# Patient Record
Sex: FEMALE | Race: WHITE | NOT HISPANIC OR LATINO | Employment: OTHER | URBAN - NONMETROPOLITAN AREA
[De-identification: names, ages, dates, MRNs, and addresses within clinical notes are randomized per-mention and may not be internally consistent; named-entity substitution may affect disease eponyms.]

---

## 2019-05-31 ENCOUNTER — OFFICE VISIT (OUTPATIENT)
Dept: CARDIOLOGY | Facility: CLINIC | Age: 64
End: 2019-05-31
Payer: MEDICARE

## 2019-05-31 VITALS
HEART RATE: 70 BPM | HEIGHT: 66 IN | SYSTOLIC BLOOD PRESSURE: 100 MMHG | DIASTOLIC BLOOD PRESSURE: 60 MMHG | WEIGHT: 123 LBS | OXYGEN SATURATION: 96 % | BODY MASS INDEX: 19.77 KG/M2

## 2019-05-31 DIAGNOSIS — R00.2 PALPITATIONS: ICD-10-CM

## 2019-05-31 DIAGNOSIS — Z13.220 SCREENING FOR CHOLESTEROL LEVEL: ICD-10-CM

## 2019-05-31 DIAGNOSIS — R07.9 CHEST PAIN, UNSPECIFIED TYPE: ICD-10-CM

## 2019-05-31 DIAGNOSIS — R07.89 OTHER CHEST PAIN: ICD-10-CM

## 2019-05-31 LAB — EKG IMPRESSION: NORMAL

## 2019-05-31 PROCEDURE — 93000 ELECTROCARDIOGRAM COMPLETE: CPT | Performed by: INTERNAL MEDICINE

## 2019-05-31 PROCEDURE — 99205 OFFICE O/P NEW HI 60 MIN: CPT | Performed by: INTERNAL MEDICINE

## 2019-05-31 RX ORDER — FLUTICASONE PROPIONATE 50 MCG
1 SPRAY, SUSPENSION (ML) NASAL DAILY
COMMUNITY

## 2019-05-31 ASSESSMENT — ENCOUNTER SYMPTOMS
SHORTNESS OF BREATH: 0
ABDOMINAL PAIN: 0
PND: 0
PALPITATIONS: 1
LOSS OF CONSCIOUSNESS: 0
DIZZINESS: 0
FALLS: 0
DEPRESSION: 0
ORTHOPNEA: 0

## 2019-05-31 NOTE — PROGRESS NOTES
Chief Complaint   Patient presents with   • Chest Pain     Subjective:   Ashley Montague is a 64 y.o. female referred to cardiology clinic for evaluation of chest pain.    Patient reports that about 10 years ago she underwent a cardiac work-up with Dr. Velazquez at [facility].  Based on her description it appears that she underwent a coronary angiogram that was reportedly normal.  At that time she had palpitations and she was instructed to perform vagal maneuvers should she have recurrent symptoms.    She reports being in his usual state of health till about 2 to 3 weeks ago when she started having palpitations that occur every 1-3 days without any clear triggers and spontaneously resolve in a few seconds to a minute. She reports associated dizziness and chills. No history of syncope.     In the past week she has had 3 episodes of substernal nonradiating chest tightness that usually occur when she is doing some type of activity either gardening or cooking and symptoms were associated with nausea, chills and fatigue and spontaneously resolved in a few minutes.  She denies any prior history of similar symptoms.  She went for a 20-minute walk today and did not have any symptoms with walking.    She is a retired .     Referring physician: Alex Mcghee MD    Past Medical History:   Diagnosis Date   • Allergic rhinitis    • ASTHMA    • Back pain    • Chronic fatigue    • Disc herniation     L4,L5   • GERD (gastroesophageal reflux disease)    • Osteoarthritis    • Perimenopausal    • Senile osteoporosis 10/9/2013     Past Surgical History:   Procedure Laterality Date   • SHOULDER ARTHROSCOPY  2007    right   • APPENDECTOMY  1970     Family History   Problem Relation Age of Onset   • Other Sister         hyperparathyroidism   • Osteoporosis Sister    • Osteoporosis Mother    • Other Maternal Grandmother         hip fracture     Social History     Social History   • Marital status:      Spouse  "name: N/A   • Number of children: N/A   • Years of education: N/A     Occupational History   • Not on file.     Social History Main Topics   • Smoking status: Never Smoker   • Smokeless tobacco: Never Used   • Alcohol use No   • Drug use: No   • Sexual activity: Not on file     Other Topics Concern   • Not on file     Social History Narrative   • No narrative on file     Allergies   Allergen Reactions   • Avelox [Moxifloxacin Hcl In Nacl]    • Pcn [Penicillins] Hives     Outpatient Encounter Prescriptions as of 5/31/2019   Medication Sig Dispense Refill   • fluticasone (FLONASE) 50 MCG/ACT nasal spray Spray 1 Spray in nose every day.     • tramadol (ULTRAM) 50 MG TABS Take 50 mg by mouth every four hours as needed.     • Cholecalciferol (VITAMIN D-3) 5000 UNITS TABS Take 1 Tab by mouth every day. 30 Tab 11   • Multiple Vitamins-Minerals (ALIVE WOMENS 50+ PO) Take  by mouth.       • Calcium-Phosphorus 600-280 MG TABS Take 600 mg by mouth 3 times a day. With meals 60 Tab    • [DISCONTINUED] Cetirizine HCl (ZYRTEC ALLERGY) 10 MG CAPS Take  by mouth 2 Times a Day. 30 Cap      No facility-administered encounter medications on file as of 5/31/2019.      Review of Systems   Constitutional: Negative for malaise/fatigue.   Respiratory: Negative for shortness of breath.    Cardiovascular: Positive for chest pain and palpitations. Negative for orthopnea, leg swelling and PND.   Gastrointestinal: Negative for abdominal pain.   Musculoskeletal: Negative for falls.   Neurological: Negative for dizziness and loss of consciousness.   Psychiatric/Behavioral: Negative for depression.   All other systems reviewed and are negative.       Objective:   /60 (BP Location: Right arm, Patient Position: Sitting)   Pulse 70   Ht 1.676 m (5' 6\")   Wt 55.8 kg (123 lb)   SpO2 96%   BMI 19.85 kg/m²     Physical Exam   Constitutional: She is oriented to person, place, and time. She appears well-developed and well-nourished. No distress. "   HENT:   Head: Normocephalic and atraumatic.   Eyes: Conjunctivae are normal. No scleral icterus.   Neck: Normal range of motion. Neck supple.   Cardiovascular: Normal rate, regular rhythm and normal heart sounds.  Exam reveals no gallop and no friction rub.    No murmur heard.  Pulmonary/Chest: Effort normal and breath sounds normal. No respiratory distress. She has no wheezes. She has no rales.   Abdominal: Soft. She exhibits no distension. There is no tenderness.   Musculoskeletal: She exhibits no edema.   Neurological: She is alert and oriented to person, place, and time.   Skin: Skin is warm and dry. She is not diaphoretic.   Psychiatric: She has a normal mood and affect. Her behavior is normal.   Nursing note and vitals reviewed.    EKG performed today was personally reviewed and per my interpretation shows sinus bradycardia 58 bpm.  Otherwise normal EKG.    Assessment:     1. Other chest pain  EKG   2. Chest pain, unspecified type  Basic Metabolic Panel    Treadmill Stress   3. Palpitations  CBC WITHOUT DIFFERENTIAL    FREE THYROXINE    TSH    Holter Monitor Study   4. Screening for cholesterol level  Lipid Profile       Medical Decision Making:  Today's Assessment / Status / Plan:     Palpitations: Patient reports having almost daily symptoms.  She will be referred for a 48-hour Holter monitor for further evaluation.  She does not use much caffeine or alcohol.    Chest discomfort: Mostly atypical in nature.  Patient exercises regularly without any cardiac symptoms.  She will be referred for an exercise treadmill test.  She is bradycardic at baseline and her symptoms could be secondary to chronotropic incompetence.  I advised patient to start a baby aspirin every day for now.     Basic labs including CBC and thyroid testing were ordered today.     Return to clinic in 3 months or earlier if needed.    Thank you for allowing me to participate in the care of this patient. Please do not hesitate to contact me  with any questions.    Supriya Kamara MD  Cardiologist  [facility]      PLEASE NOTE: This dictation was created using voice recognition software.

## 2019-06-04 ENCOUNTER — NON-PROVIDER VISIT (OUTPATIENT)
Dept: CARDIOLOGY | Facility: CLINIC | Age: 64
End: 2019-06-04
Payer: MEDICARE

## 2019-06-04 DIAGNOSIS — R00.2 PALPITATIONS: ICD-10-CM

## 2019-06-04 DIAGNOSIS — I49.3 PVC (PREMATURE VENTRICULAR CONTRACTION): ICD-10-CM

## 2019-06-07 DIAGNOSIS — R00.2 PALPITATIONS: ICD-10-CM

## 2019-06-10 LAB — EKG IMPRESSION: NORMAL

## 2019-06-10 PROCEDURE — 93224 XTRNL ECG REC UP TO 48 HRS: CPT | Performed by: INTERNAL MEDICINE

## 2019-10-17 ENCOUNTER — OFFICE VISIT (OUTPATIENT)
Dept: CARDIOLOGY | Facility: CLINIC | Age: 64
End: 2019-10-17
Payer: MEDICARE

## 2019-10-17 VITALS
WEIGHT: 125 LBS | OXYGEN SATURATION: 94 % | BODY MASS INDEX: 20.09 KG/M2 | SYSTOLIC BLOOD PRESSURE: 100 MMHG | DIASTOLIC BLOOD PRESSURE: 50 MMHG | HEIGHT: 66 IN | HEART RATE: 80 BPM

## 2019-10-17 DIAGNOSIS — R53.83 FATIGUE, UNSPECIFIED TYPE: ICD-10-CM

## 2019-10-17 DIAGNOSIS — R00.2 PALPITATIONS: ICD-10-CM

## 2019-10-17 DIAGNOSIS — G47.33 OSA (OBSTRUCTIVE SLEEP APNEA): ICD-10-CM

## 2019-10-17 DIAGNOSIS — R07.9 CHEST PAIN, UNSPECIFIED TYPE: ICD-10-CM

## 2019-10-17 PROCEDURE — 99215 OFFICE O/P EST HI 40 MIN: CPT | Performed by: INTERNAL MEDICINE

## 2019-10-17 ASSESSMENT — ENCOUNTER SYMPTOMS
DIZZINESS: 0
PND: 0
FALLS: 0
SHORTNESS OF BREATH: 0
PALPITATIONS: 1
ORTHOPNEA: 0
LOSS OF CONSCIOUSNESS: 0
ABDOMINAL PAIN: 0
DEPRESSION: 0
HEADACHES: 1

## 2019-10-17 NOTE — PROGRESS NOTES
Chief Complaint   Patient presents with   • Chest Pain     Subjective:   Ashley Montague is a 4-year-old female presented clinic for follow-up on chest discomfort.    Since her last visit, patient reports that she has not had any recurrent chest discomfort.    Her palpitations have significantly improved with hydration.  On the days that she does have recurrent symptoms she notices that she has not had enough water.    Her main concern today is fatigue that she notices almost all day long.  She is also noticed daytime headaches and often requires naps during the daytime.  Her  does not report that she snores at night but the patient does report working up in the middle of the night gasping for air.    Prior history:  Around 2009, patient underwent a cardiac work-up with Dr. Velazquez at [facility].  Based on her description it appears that she underwent a coronary angiogram that was reportedly normal.  At that time she had palpitations and she was instructed to perform vagal maneuvers should she have recurrent symptoms.    She is a retired .       Past Medical History:   Diagnosis Date   • Allergic rhinitis    • ASTHMA    • Back pain    • Chronic fatigue    • Disc herniation     L4,L5   • GERD (gastroesophageal reflux disease)    • Osteoarthritis    • Perimenopausal    • Senile osteoporosis 10/9/2013     Past Surgical History:   Procedure Laterality Date   • SHOULDER ARTHROSCOPY  2007    right   • APPENDECTOMY  1970     Family History   Problem Relation Age of Onset   • Other Sister         hyperparathyroidism   • Osteoporosis Sister    • Osteoporosis Mother    • Other Maternal Grandmother         hip fracture     Social History     Socioeconomic History   • Marital status:      Spouse name: Not on file   • Number of children: Not on file   • Years of education: Not on file   • Highest education level: Not on file   Occupational History   • Not on file   Social Needs   • Financial  resource strain: Not on file   • Food insecurity:     Worry: Not on file     Inability: Not on file   • Transportation needs:     Medical: Not on file     Non-medical: Not on file   Tobacco Use   • Smoking status: Never Smoker   • Smokeless tobacco: Never Used   Substance and Sexual Activity   • Alcohol use: No   • Drug use: No   • Sexual activity: Not on file   Lifestyle   • Physical activity:     Days per week: Not on file     Minutes per session: Not on file   • Stress: Not on file   Relationships   • Social connections:     Talks on phone: Not on file     Gets together: Not on file     Attends Anglican service: Not on file     Active member of club or organization: Not on file     Attends meetings of clubs or organizations: Not on file     Relationship status: Not on file   • Intimate partner violence:     Fear of current or ex partner: Not on file     Emotionally abused: Not on file     Physically abused: Not on file     Forced sexual activity: Not on file   Other Topics Concern   • Not on file   Social History Narrative   • Not on file     Allergies   Allergen Reactions   • Avelox [Moxifloxacin Hcl In Nacl]    • Pcn [Penicillins] Hives     Outpatient Encounter Medications as of 10/17/2019   Medication Sig Dispense Refill   • fluticasone (FLONASE) 50 MCG/ACT nasal spray Spray 1 Spray in nose every day.     • aspirin EC (ECOTRIN) 81 MG Tablet Delayed Response Take 1 Tab by mouth every day. 30 Tab    • tramadol (ULTRAM) 50 MG TABS Take 50 mg by mouth every four hours as needed.     • Cholecalciferol (VITAMIN D-3) 5000 UNITS TABS Take 1 Tab by mouth every day. 30 Tab 11   • Calcium-Phosphorus 600-280 MG TABS Take 600 mg by mouth 3 times a day. With meals 60 Tab    • Multiple Vitamins-Minerals (ALIVE WOMENS 50+ PO) Take  by mouth.         No facility-administered encounter medications on file as of 10/17/2019.      Review of Systems   Constitutional: Positive for malaise/fatigue.   Respiratory: Negative for  "shortness of breath.    Cardiovascular: Positive for palpitations. Negative for chest pain, orthopnea, leg swelling and PND.   Gastrointestinal: Negative for abdominal pain.   Musculoskeletal: Negative for falls.   Neurological: Positive for headaches. Negative for dizziness and loss of consciousness.   Psychiatric/Behavioral: Negative for depression.   All other systems reviewed and are negative.       Objective:   /50 (BP Location: Right arm, Patient Position: Sitting)   Pulse 80   Ht 1.676 m (5' 6\")   Wt 56.7 kg (125 lb)   SpO2 94%   BMI 20.18 kg/m²     Physical Exam   Constitutional: She is oriented to person, place, and time. She appears well-developed and well-nourished. No distress.   HENT:   Head: Normocephalic and atraumatic.   Eyes: Conjunctivae are normal. No scleral icterus.   Neck: Normal range of motion. Neck supple.   Cardiovascular: Normal rate, regular rhythm and normal heart sounds. Exam reveals no gallop and no friction rub.   No murmur heard.  Pulmonary/Chest: Effort normal and breath sounds normal. No respiratory distress. She has no wheezes. She has no rales.   Abdominal: Soft. She exhibits no distension. There is no tenderness.   Musculoskeletal: She exhibits no edema.   Neurological: She is alert and oriented to person, place, and time.   Skin: Skin is warm and dry. She is not diaphoretic.   Psychiatric: She has a normal mood and affect. Her behavior is normal.   Nursing note and vitals reviewed.    Labs performed in June 2019 were reviewed and showed normal hemoglobin and creatinine.  .  HDL 70.    Holter monitor performed June 2019 was personally reviewed and per my interpretation showed sinus rhythm.  No sustained arrhythmias.    Exercise treadmill test performed in June 2019 was personally reviewed and per my interpretation did not show any evidence of ischemia.  Patient exercised on the Kory protocol for almost 9 minutes and target heart rate was " achieved.    Assessment:     1. Chest pain, unspecified type     2. Palpitations     3. Fatigue, unspecified type  REFERRAL TO SLEEP STUDIES   4. NAGA (obstructive sleep apnea)  REFERRAL TO SLEEP STUDIES       Medical Decision Making:  Today's Assessment / Status / Plan:     Palpitations: Patient symptoms have improved with hydration.  I have encouraged her to continue staying hydrated as much as possible.  If her symptoms worsen, she should let us know.    Chest discomfort: Atypical in nature.  Patient has not had any recurrent symptoms.  Her treadmill test was low risk as detailed above.  For now no further work-up.  If she has recurrent symptoms, she should let us know.    Fatigue: new issue.  Morning headaches:  Concern for sleep apnea:  Patient symptoms are concerning for sleep apnea and she will therefore be referred for a sleep study.  Referral placed today.  Patient is agreeable.    Return to clinic if needed.    Thank you for allowing me to participate in the care of this patient. Please do not hesitate to contact me with any questions.    Supriya Kamara MD  Cardiologist  [facility]      PLEASE NOTE: This dictation was created using voice recognition software.

## 2019-10-17 NOTE — LETTER
[facility] [address],   [address]nd [place] [address]  Phone: [phone]  Fax: [phone]              Ashley Montague  1955    Encounter Date: 10/17/2019    Supriya Kamara M.D.          PROGRESS NOTE:  Chief Complaint   Patient presents with   • Chest Pain     Subjective:   Ashley Montague is a 4-year-old female presented clinic for follow-up on chest discomfort.    Since her last visit, patient reports that she has not had any recurrent chest discomfort.    Her palpitations have significantly improved with hydration.  On the days that she does have recurrent symptoms she notices that she has not had enough water.    Her main concern today is fatigue that she notices almost all day long.  She is also noticed daytime headaches and often requires naps during the daytime.  Her  does not report that she snores at night but the patient does report working up in the middle of the night gasping for air.    Prior history:  Around 2009, patient underwent a cardiac work-up with Dr. Velazquez at [facility].  Based on her description it appears that she underwent a coronary angiogram that was reportedly normal.  At that time she had palpitations and she was instructed to perform vagal maneuvers should she have recurrent symptoms.    She is a retired .       Past Medical History:   Diagnosis Date   • Allergic rhinitis    • ASTHMA    • Back pain    • Chronic fatigue    • Disc herniation     L4,L5   • GERD (gastroesophageal reflux disease)    • Osteoarthritis    • Perimenopausal    • Senile osteoporosis 10/9/2013     Past Surgical History:   Procedure Laterality Date   • SHOULDER ARTHROSCOPY  2007    right   • APPENDECTOMY  1970     Family History   Problem Relation Age of Onset   • Other Sister         hyperparathyroidism   • Osteoporosis Sister    • Osteoporosis Mother    • Other Maternal Grandmother         hip fracture       Social History     Socioeconomic History   • Marital status:      Spouse name: Not on file   • Number of children: Not on file   • Years of education: Not on file   • Highest education level: Not on file   Occupational History   • Not on file   Social Needs   • Financial resource strain: Not on file   • Food insecurity:     Worry: Not on file     Inability: Not on file   • Transportation needs:     Medical: Not on file     Non-medical: Not on file   Tobacco Use   • Smoking status: Never Smoker   • Smokeless tobacco: Never Used   Substance and Sexual Activity   • Alcohol use: No   • Drug use: No   • Sexual activity: Not on file   Lifestyle   • Physical activity:     Days per week: Not on file     Minutes per session: Not on file   • Stress: Not on file   Relationships   • Social connections:     Talks on phone: Not on file     Gets together: Not on file     Attends Christian service: Not on file     Active member of club or organization: Not on file     Attends meetings of clubs or organizations: Not on file     Relationship status: Not on file   • Intimate partner violence:     Fear of current or ex partner: Not on file     Emotionally abused: Not on file     Physically abused: Not on file     Forced sexual activity: Not on file   Other Topics Concern   • Not on file   Social History Narrative   • Not on file     Allergies   Allergen Reactions   • Avelox [Moxifloxacin Hcl In Nacl]    • Pcn [Penicillins] Hives     Outpatient Encounter Medications as of 10/17/2019   Medication Sig Dispense Refill   • fluticasone (FLONASE) 50 MCG/ACT nasal spray Spray 1 Spray in nose every day.     • aspirin EC (ECOTRIN) 81 MG Tablet Delayed Response Take 1 Tab by mouth every day. 30 Tab    • tramadol (ULTRAM) 50 MG TABS Take 50 mg by mouth every four hours as needed.     • Cholecalciferol (VITAMIN D-3) 5000 UNITS TABS Take 1 Tab by mouth every day. 30 Tab 11   • Calcium-Phosphorus 600-280 MG TABS Take 600 mg by mouth 3  "times a day. With meals 60 Tab    • Multiple Vitamins-Minerals (ALIVE WOMENS 50+ PO) Take  by mouth.         No facility-administered encounter medications on file as of 10/17/2019.      Review of Systems   Constitutional: Positive for malaise/fatigue.   Respiratory: Negative for shortness of breath.    Cardiovascular: Positive for palpitations. Negative for chest pain, orthopnea, leg swelling and PND.   Gastrointestinal: Negative for abdominal pain.   Musculoskeletal: Negative for falls.   Neurological: Positive for headaches. Negative for dizziness and loss of consciousness.   Psychiatric/Behavioral: Negative for depression.   All other systems reviewed and are negative.       Objective:   /50 (BP Location: Right arm, Patient Position: Sitting)   Pulse 80   Ht 1.676 m (5' 6\")   Wt 56.7 kg (125 lb)   SpO2 94%   BMI 20.18 kg/m²      Physical Exam   Constitutional: She is oriented to person, place, and time. She appears well-developed and well-nourished. No distress.   HENT:   Head: Normocephalic and atraumatic.   Eyes: Conjunctivae are normal. No scleral icterus.   Neck: Normal range of motion. Neck supple.   Cardiovascular: Normal rate, regular rhythm and normal heart sounds. Exam reveals no gallop and no friction rub.   No murmur heard.  Pulmonary/Chest: Effort normal and breath sounds normal. No respiratory distress. She has no wheezes. She has no rales.   Abdominal: Soft. She exhibits no distension. There is no tenderness.   Musculoskeletal: She exhibits no edema.   Neurological: She is alert and oriented to person, place, and time.   Skin: Skin is warm and dry. She is not diaphoretic.   Psychiatric: She has a normal mood and affect. Her behavior is normal.   Nursing note and vitals reviewed.    Labs performed in June 2019 were reviewed and showed normal hemoglobin and creatinine.  .  HDL 70.    Holter monitor performed June 2019 was personally reviewed and per my interpretation showed sinus " rhythm.  No sustained arrhythmias.    Exercise treadmill test performed in June 2019 was personally reviewed and per my interpretation did not show any evidence of ischemia.  Patient exercised on the Kory protocol for almost 9 minutes and target heart rate was achieved.    Assessment:     1. Chest pain, unspecified type     2. Palpitations     3. Fatigue, unspecified type  REFERRAL TO SLEEP STUDIES   4. NAGA (obstructive sleep apnea)  REFERRAL TO SLEEP STUDIES       Medical Decision Making:  Today's Assessment / Status / Plan:     Palpitations: Patient symptoms have improved with hydration.  I have encouraged her to continue staying hydrated as much as possible.  If her symptoms worsen, she should let us know.    Chest discomfort: Atypical in nature.  Patient has not had any recurrent symptoms.  Her treadmill test was low risk as detailed above.  For now no further work-up.  If she has recurrent symptoms, she should let us know.    Fatigue: new issue.  Morning headaches:  Concern for sleep apnea:  Patient symptoms are concerning for sleep apnea and she will therefore be referred for a sleep study.  Referral placed today.  Patient is agreeable.    Return to clinic if needed.    Thank you for allowing me to participate in the care of this patient. Please do not hesitate to contact me with any questions.    Supriya Kamara MD  Cardiologist  [facility]      PLEASE NOTE: This dictation was created using voice recognition software.         Cedric Clayton D.O.  [id number]  [place] [address]  VIA Facsimile: [phone]

## 2019-11-15 ENCOUNTER — APPOINTMENT (RX ONLY)
Dept: URBAN - METROPOLITAN AREA CLINIC 31 | Facility: CLINIC | Age: 64
Setting detail: DERMATOLOGY
End: 2019-11-15

## 2019-11-15 DIAGNOSIS — L57.0 ACTINIC KERATOSIS: ICD-10-CM

## 2019-11-15 DIAGNOSIS — L82.1 OTHER SEBORRHEIC KERATOSIS: ICD-10-CM

## 2019-11-15 DIAGNOSIS — Z85.828 PERSONAL HISTORY OF OTHER MALIGNANT NEOPLASM OF SKIN: ICD-10-CM

## 2019-11-15 PROBLEM — D48.5 NEOPLASM OF UNCERTAIN BEHAVIOR OF SKIN: Status: ACTIVE | Noted: 2019-11-15

## 2019-11-15 PROCEDURE — 99202 OFFICE O/P NEW SF 15 MIN: CPT | Mod: 25

## 2019-11-15 PROCEDURE — 17003 DESTRUCT PREMALG LES 2-14: CPT

## 2019-11-15 PROCEDURE — 17000 DESTRUCT PREMALG LESION: CPT | Mod: 59

## 2019-11-15 PROCEDURE — ? BIOPSY BY SHAVE METHOD

## 2019-11-15 PROCEDURE — ? COUNSELING

## 2019-11-15 PROCEDURE — 11102 TANGNTL BX SKIN SINGLE LES: CPT

## 2019-11-15 PROCEDURE — ? LIQUID NITROGEN

## 2019-11-15 ASSESSMENT — LOCATION DETAILED DESCRIPTION DERM
LOCATION DETAILED: RIGHT MID PREAURICULAR CHEEK
LOCATION DETAILED: LEFT CENTRAL MALAR CHEEK
LOCATION DETAILED: LEFT SUPERIOR CENTRAL MALAR CHEEK
LOCATION DETAILED: RIGHT SUPERIOR CENTRAL MALAR CHEEK
LOCATION DETAILED: LEFT MEDIAL MALAR CHEEK
LOCATION DETAILED: LEFT INFERIOR CENTRAL MALAR CHEEK
LOCATION DETAILED: LEFT CENTRAL BUCCAL CHEEK
LOCATION DETAILED: NASAL DORSUM
LOCATION DETAILED: RIGHT INFERIOR PREAURICULAR CHEEK
LOCATION DETAILED: RIGHT EYEBROW
LOCATION DETAILED: RIGHT INFERIOR MEDIAL MALAR CHEEK
LOCATION DETAILED: LEFT SUPERIOR LATERAL FOREHEAD

## 2019-11-15 ASSESSMENT — LOCATION SIMPLE DESCRIPTION DERM
LOCATION SIMPLE: NOSE
LOCATION SIMPLE: LEFT CHEEK
LOCATION SIMPLE: RIGHT EYEBROW
LOCATION SIMPLE: LEFT FOREHEAD
LOCATION SIMPLE: RIGHT CHEEK

## 2019-11-15 ASSESSMENT — LOCATION ZONE DERM
LOCATION ZONE: NOSE
LOCATION ZONE: FACE

## 2019-11-15 NOTE — PROCEDURE: BIOPSY BY SHAVE METHOD
Lab Facility: 
Cryotherapy Text: The wound bed was treated with cryotherapy after the biopsy was performed.
Destruction After The Procedure: No
Wound Care: Vaseline
Post-Care Instructions: I reviewed with the patient in detail post-care instructions. Patient is to keep the biopsy site dry overnight, and then apply vaseline twice daily until healed.
Biopsy Type: H and E
Additional Anesthesia Volume In Cc (Will Not Render If 0): 0
Billing Type: Third-Party Bill
Lab: [id number]
Depth Of Biopsy: dermis
Curettage Text: The wound bed was treated with curettage after the biopsy was performed.
Anesthesia Type: 1% lidocaine with epinephrine
Size Of Lesion In Cm: 0.5
Silver Nitrate Text: The wound bed was treated with silver nitrate after the biopsy was performed.
Dressing: bandage
Hemostasis: Drysol and Electrocautery
Render Post-Care Instructions In Note?: yes
Detail Level: Detailed
Electrodesiccation And Curettage Text: The wound bed was treated with electrodesiccation and curettage after the biopsy was performed.
Type Of Destruction Used: Curettage
Biopsy Method: Personna blade
Consent: Written consent was obtained and risks were reviewed including but not limited to scarring, infection, bleeding, scabbing, incomplete removal, nerve damage and allergy to anesthesia.
Electrodesiccation Text: The wound bed was treated with electrodesiccation after the biopsy was performed.
Notification Instructions: Patient will be notified of biopsy results. However, patient instructed to call the office if not contacted within 2 weeks.

## 2021-07-16 ENCOUNTER — OFFICE VISIT (OUTPATIENT)
Dept: CARDIOLOGY | Facility: CLINIC | Age: 66
End: 2021-07-16
Payer: MEDICARE

## 2021-07-16 VITALS
HEIGHT: 66 IN | RESPIRATION RATE: 12 BRPM | DIASTOLIC BLOOD PRESSURE: 56 MMHG | WEIGHT: 135.2 LBS | SYSTOLIC BLOOD PRESSURE: 88 MMHG | OXYGEN SATURATION: 97 % | BODY MASS INDEX: 21.73 KG/M2 | HEART RATE: 68 BPM

## 2021-07-16 DIAGNOSIS — I95.1 ORTHOSTATIC HYPOTENSION: ICD-10-CM

## 2021-07-16 DIAGNOSIS — R00.2 PALPITATIONS: ICD-10-CM

## 2021-07-16 DIAGNOSIS — Z13.220 SCREENING FOR CHOLESTEROL LEVEL: ICD-10-CM

## 2021-07-16 DIAGNOSIS — R42 DIZZINESS: ICD-10-CM

## 2021-07-16 PROCEDURE — 99214 OFFICE O/P EST MOD 30 MIN: CPT | Performed by: INTERNAL MEDICINE

## 2021-07-16 ASSESSMENT — ENCOUNTER SYMPTOMS
FALLS: 0
PALPITATIONS: 1
PND: 0
HEADACHES: 0
ABDOMINAL PAIN: 0
LOSS OF CONSCIOUSNESS: 0
DEPRESSION: 0
ORTHOPNEA: 0
DIZZINESS: 1
SHORTNESS OF BREATH: 0

## 2021-07-16 NOTE — PROGRESS NOTES
Chief Complaint   Patient presents with   • Palpitations     Subjective:   Ashley Montague is a 4-year-old female presented clinic for evaluation of dizziness.    Patient reports dizziness with standing up either from a sitting position or from lying down.  Her blood pressure is almost always low, like today.  She is worried about getting a pacemaker as her father had one.  She occasionally reports palpitations which improved after she drinks water.  She has not had any syncopal episodes.      Past Medical History:   Diagnosis Date   • Allergic rhinitis    • ASTHMA    • Back pain    • Chronic fatigue    • Disc herniation     L4,L5   • GERD (gastroesophageal reflux disease)    • Osteoarthritis    • Perimenopausal    • Senile osteoporosis 10/9/2013     Past Surgical History:   Procedure Laterality Date   • SHOULDER ARTHROSCOPY  2007    right   • APPENDECTOMY  1970     Family History   Problem Relation Age of Onset   • Other Sister         hyperparathyroidism   • Osteoporosis Sister    • Osteoporosis Mother    • Other Maternal Grandmother         hip fracture     Social History     Socioeconomic History   • Marital status:      Spouse name: Not on file   • Number of children: Not on file   • Years of education: Not on file   • Highest education level: Not on file   Occupational History   • Not on file   Tobacco Use   • Smoking status: Never Smoker   • Smokeless tobacco: Never Used   Substance and Sexual Activity   • Alcohol use: Yes     Comment: Occ   • Drug use: No   • Sexual activity: Not on file   Other Topics Concern   • Not on file   Social History Narrative   • Not on file     Social Determinants of Health     Financial Resource Strain:    • Difficulty of Paying Living Expenses:    Food Insecurity:    • Worried About Running Out of Food in the Last Year:    • Ran Out of Food in the Last Year:    Transportation Needs:    • Lack of Transportation (Medical):    • Lack of Transportation (Non-Medical):     Physical Activity:    • Days of Exercise per Week:    • Minutes of Exercise per Session:    Stress:    • Feeling of Stress :    Social Connections:    • Frequency of Communication with Friends and Family:    • Frequency of Social Gatherings with Friends and Family:    • Attends [employer] Services:    • Active Member of Clubs or Organizations:    • Attends Club or Organization Meetings:    • Marital Status:    Intimate Partner Violence:    • Fear of Current or Ex-Partner:    • Emotionally Abused:    • Physically Abused:    • Sexually Abused:      Allergies   Allergen Reactions   • Avelox [Moxifloxacin Hcl In Nacl]    • Pcn [Penicillins] Hives     Outpatient Encounter Medications as of 7/16/2021   Medication Sig Dispense Refill   • methylPREDNISolone (MEDROL DOSEPAK) 4 MG Tablet Therapy Pack Follow schedule on package instructions. 21 tablet 0   • fluticasone (FLONASE) 50 MCG/ACT nasal spray Spray 1 Spray in nose every day.     • tramadol (ULTRAM) 50 MG TABS Take 50 mg by mouth every four hours as needed.     • Cholecalciferol (VITAMIN D-3) 5000 UNITS TABS Take 1 Tab by mouth every day. 30 Tab 11   • Calcium-Phosphorus 600-280 MG TABS Take 600 mg by mouth 3 times a day. With meals 60 Tab    • Multiple Vitamins-Minerals (ALIVE WOMENS 50+ PO) Take  by mouth.       • [DISCONTINUED] aspirin EC (ECOTRIN) 81 MG Tablet Delayed Response Take 1 Tab by mouth every day. (Patient not taking: Reported on 7/16/2021) 30 Tab      No facility-administered encounter medications on file as of 7/16/2021.     Review of Systems   Constitutional: Negative for malaise/fatigue.   Respiratory: Negative for shortness of breath.    Cardiovascular: Positive for palpitations. Negative for chest pain, orthopnea, leg swelling and PND.   Gastrointestinal: Negative for abdominal pain.   Musculoskeletal: Negative for falls.   Neurological: Positive for dizziness. Negative for loss of consciousness and headaches.   Psychiatric/Behavioral: Negative for  "depression.   All other systems reviewed and are negative.       Objective:   BP (!) 88/56 (BP Location: Left arm, Patient Position: Sitting, BP Cuff Size: Adult)   Pulse 68   Resp 12   Ht 1.676 m (5' 6\")   Wt 61.3 kg (135 lb 3.2 oz)   SpO2 97%   BMI 21.82 kg/m²     Physical Exam   Constitutional: She is oriented to person, place, and time. She appears well-developed. No distress.   HENT:   Head: Normocephalic and atraumatic.   Eyes: Conjunctivae are normal. No scleral icterus.   Cardiovascular: Normal rate, regular rhythm and normal heart sounds. Exam reveals no gallop and no friction rub.   No murmur heard.  Pulmonary/Chest: Effort normal and breath sounds normal. No respiratory distress. She has no wheezes. She has no rales.   Musculoskeletal:      Cervical back: Normal range of motion and neck supple.   Neurological: She is alert and oriented to person, place, and time.   Skin: Skin is warm and dry. She is not diaphoretic.   Psychiatric: Her behavior is normal.   Nursing note and vitals reviewed.    Assessment:     1. Dizziness  CBC WITHOUT DIFFERENTIAL    Basic Metabolic Panel   2. Screening for cholesterol level  Lipid Profile   3. Palpitations  TSH   4. Orthostatic hypotension         Medical Decision Making:  Today's Assessment / Status / Plan:     Her dizziness is likely secondary to orthostasis.  We have given her written instructions to increase her fluid intake along with salt as well.  Other lifestyle modification has been discussed with her as well.  If she continues to have symptoms despite these modifications, she will let us know and we can consider medications.  Basic labs have been ordered today as well.    Her palpitations are rare and likely related to the same reason.  For now lifestyle modification would be recommended.  If her palpitations fail to improve or worsen, she will let us know.     32 minutes were spent on the patient's encounter today.  I was with the patient and her  " discussing management as above for 21 minutes.  The rest of the time was spent with chart review and documentation.    Return to clinic in 3 months or earlier if needed.    Thank you for allowing me to participate in the care of this patient. Please do not hesitate to contact me with any questions.    Supriya Kamara MD, [facility]  Cardiologist  [facility]    PLEASE NOTE: This dictation was created using voice recognition software.

## 2021-07-16 NOTE — LETTER
[facility] [address],   [address] [place] [address]  Phone: [phone]  Fax: [phone]              Ashley Montague  1955    Encounter Date: 7/16/2021    Supriya Kamara M.D.          PROGRESS NOTE:  Chief Complaint   Patient presents with   • Palpitations     Subjective:   Ashley Montague is a 4-year-old female presented clinic for evaluation of dizziness.    Patient reports dizziness with standing up either from a sitting position or from lying down.  Her blood pressure is almost always low, like today.  She is worried about getting a pacemaker as her father had one.  She occasionally reports palpitations which improved after she drinks water.  She has not had any syncopal episodes.      Past Medical History:   Diagnosis Date   • Allergic rhinitis    • ASTHMA    • Back pain    • Chronic fatigue    • Disc herniation     L4,L5   • GERD (gastroesophageal reflux disease)    • Osteoarthritis    • Perimenopausal    • Senile osteoporosis 10/9/2013     Past Surgical History:   Procedure Laterality Date   • SHOULDER ARTHROSCOPY  2007    right   • APPENDECTOMY  1970     Family History   Problem Relation Age of Onset   • Other Sister         hyperparathyroidism   • Osteoporosis Sister    • Osteoporosis Mother    • Other Maternal Grandmother         hip fracture     Social History     Socioeconomic History   • Marital status:      Spouse name: Not on file   • Number of children: Not on file   • Years of education: Not on file   • Highest education level: Not on file   Occupational History   • Not on file   Tobacco Use   • Smoking status: Never Smoker   • Smokeless tobacco: Never Used   Substance and Sexual Activity   • Alcohol use: Yes     Comment: Occ   • Drug use: No   • Sexual activity: Not on file   Other Topics Concern   • Not on file   Social History Narrative   • Not on file     Social Determinants of Health     Financial Resource  Strain:    • Difficulty of Paying Living Expenses:    Food Insecurity:    • Worried About Running Out of Food in the Last Year:    • Ran Out of Food in the Last Year:    Transportation Needs:    • Lack of Transportation (Medical):    • Lack of Transportation (Non-Medical):    Physical Activity:    • Days of Exercise per Week:    • Minutes of Exercise per Session:    Stress:    • Feeling of Stress :    Social Connections:    • Frequency of Communication with Friends and Family:    • Frequency of Social Gatherings with Friends and Family:    • Attends [employer] Services:    • Active Member of Clubs or Organizations:    • Attends Club or Organization Meetings:    • Marital Status:    Intimate Partner Violence:    • Fear of Current or Ex-Partner:    • Emotionally Abused:    • Physically Abused:    • Sexually Abused:      Allergies   Allergen Reactions   • Avelox [Moxifloxacin Hcl In Nacl]    • Pcn [Penicillins] Hives     Outpatient Encounter Medications as of 7/16/2021   Medication Sig Dispense Refill   • methylPREDNISolone (MEDROL DOSEPAK) 4 MG Tablet Therapy Pack Follow schedule on package instructions. 21 tablet 0   • fluticasone (FLONASE) 50 MCG/ACT nasal spray Spray 1 Spray in nose every day.     • tramadol (ULTRAM) 50 MG TABS Take 50 mg by mouth every four hours as needed.     • Cholecalciferol (VITAMIN D-3) 5000 UNITS TABS Take 1 Tab by mouth every day. 30 Tab 11   • Calcium-Phosphorus 600-280 MG TABS Take 600 mg by mouth 3 times a day. With meals 60 Tab    • Multiple Vitamins-Minerals (ALIVE WOMENS 50+ PO) Take  by mouth.       • [DISCONTINUED] aspirin EC (ECOTRIN) 81 MG Tablet Delayed Response Take 1 Tab by mouth every day. (Patient not taking: Reported on 7/16/2021) 30 Tab      No facility-administered encounter medications on file as of 7/16/2021.     Review of Systems   Constitutional: Negative for malaise/fatigue.   Respiratory: Negative for shortness of breath.    Cardiovascular: Positive for palpitations.  "Negative for chest pain, orthopnea, leg swelling and PND.   Gastrointestinal: Negative for abdominal pain.   Musculoskeletal: Negative for falls.   Neurological: Positive for dizziness. Negative for loss of consciousness and headaches.   Psychiatric/Behavioral: Negative for depression.   All other systems reviewed and are negative.       Objective:   BP (!) 88/56 (BP Location: Left arm, Patient Position: Sitting, BP Cuff Size: Adult)   Pulse 68   Resp 12   Ht 1.676 m (5' 6\")   Wt 61.3 kg (135 lb 3.2 oz)   SpO2 97%   BMI 21.82 kg/m²     Physical Exam   Constitutional: She is oriented to person, place, and time. She appears well-developed. No distress.   HENT:   Head: Normocephalic and atraumatic.   Eyes: Conjunctivae are normal. No scleral icterus.   Cardiovascular: Normal rate, regular rhythm and normal heart sounds. Exam reveals no gallop and no friction rub.   No murmur heard.  Pulmonary/Chest: Effort normal and breath sounds normal. No respiratory distress. She has no wheezes. She has no rales.   Musculoskeletal:      Cervical back: Normal range of motion and neck supple.   Neurological: She is alert and oriented to person, place, and time.   Skin: Skin is warm and dry. She is not diaphoretic.   Psychiatric: Her behavior is normal.   Nursing note and vitals reviewed.    Assessment:     1. Dizziness  CBC WITHOUT DIFFERENTIAL    Basic Metabolic Panel   2. Screening for cholesterol level  Lipid Profile   3. Palpitations  TSH   4. Orthostatic hypotension         Medical Decision Making:  Today's Assessment / Status / Plan:     Her dizziness is likely secondary to orthostasis.  We have given her written instructions to increase her fluid intake along with salt as well.  Other lifestyle modification has been discussed with her as well.  If she continues to have symptoms despite these modifications, she will let us know and we can consider medications.  Basic labs have been ordered today as well.    Her " palpitations are rare and likely related to the same reason.  For now lifestyle modification would be recommended.  If her palpitations fail to improve or worsen, she will let us know.     32 minutes were spent on the patient's encounter today.  I was with the patient and her  discussing management as above for 21 minutes.  The rest of the time was spent with chart review and documentation.    Return to clinic in 3 months or earlier if needed.    Thank you for allowing me to participate in the care of this patient. Please do not hesitate to contact me with any questions.    Supriya Kamara MD, [facility]  Cardiologist  [facility]    PLEASE NOTE: This dictation was created using voice recognition software.           Cedric Clayton D.O.  [address] [facility]  [place] [address]  Via Fax: [phone]

## 2021-07-16 NOTE — PATIENT INSTRUCTIONS
Please increase your fluid intake.  Ideally at least 70 to 80 ounces per day.  Minimize alcohol and caffeine intake.  Please increase your salt intake.  Ideally about 6 to 10 g/day. If you cannot do that with meals, consider salt tablets. You can also try buffered salt tablets.   Please remember to stand up slowly and consider exercising your legs before standing up.  If you have dizziness, please remember to sit down or squat immediately.  If you are unable to sit down for any reason, consider crossing your legs and tensing your lower body muscles.  Try using compression stockings especially when standing for long durations.  Please remember to take them off every night.  If your symptoms continue to persist or worsen, please let us know.  If you have symptoms while driving, please remember to pull over.

## 2021-11-16 ENCOUNTER — APPOINTMENT (OUTPATIENT)
Dept: CARDIOLOGY | Facility: CLINIC | Age: 66
End: 2021-11-16
Payer: MEDICARE

## 2023-07-11 PROBLEM — M54.16 LUMBAR RADICULOPATHY: Status: ACTIVE | Noted: 2022-10-11

## 2024-11-25 ENCOUNTER — TELEPHONE (OUTPATIENT)
Dept: CARDIOLOGY | Facility: MEDICAL CENTER | Age: 69
End: 2024-11-25
Payer: MEDICARE

## 2024-12-03 ENCOUNTER — OFFICE VISIT (OUTPATIENT)
Dept: CARDIOLOGY | Facility: MEDICAL CENTER | Age: 69
End: 2024-12-03
Attending: INTERNAL MEDICINE
Payer: MEDICARE

## 2024-12-03 VITALS
SYSTOLIC BLOOD PRESSURE: 108 MMHG | WEIGHT: 135 LBS | DIASTOLIC BLOOD PRESSURE: 72 MMHG | BODY MASS INDEX: 21.69 KG/M2 | RESPIRATION RATE: 15 BRPM | OXYGEN SATURATION: 95 % | HEART RATE: 67 BPM | HEIGHT: 66 IN

## 2024-12-03 DIAGNOSIS — R55 VASOVAGAL NEAR SYNCOPE: Chronic | ICD-10-CM

## 2024-12-03 DIAGNOSIS — Z13.220 SCREENING FOR LIPID DISORDERS: ICD-10-CM

## 2024-12-03 DIAGNOSIS — I49.1 ATRIAL ECTOPY: Chronic | ICD-10-CM

## 2024-12-03 DIAGNOSIS — Z00.00 ENCOUNTER FOR MEDICAL EXAMINATION TO ESTABLISH CARE: ICD-10-CM

## 2024-12-03 DIAGNOSIS — R00.2 PALPITATIONS: ICD-10-CM

## 2024-12-03 DIAGNOSIS — I49.9 CARDIAC ARRHYTHMIA, UNSPECIFIED CARDIAC ARRHYTHMIA TYPE: ICD-10-CM

## 2024-12-03 DIAGNOSIS — I95.1 ORTHOSTATIC HYPOTENSION: Chronic | ICD-10-CM

## 2024-12-03 LAB — EKG IMPRESSION: NORMAL

## 2024-12-03 PROCEDURE — 99203 OFFICE O/P NEW LOW 30 MIN: CPT | Performed by: INTERNAL MEDICINE

## 2024-12-03 PROCEDURE — 93010 ELECTROCARDIOGRAM REPORT: CPT | Performed by: INTERNAL MEDICINE

## 2024-12-03 PROCEDURE — 93005 ELECTROCARDIOGRAM TRACING: CPT | Mod: TC | Performed by: INTERNAL MEDICINE

## 2024-12-03 PROCEDURE — 99203 OFFICE O/P NEW LOW 30 MIN: CPT | Mod: 25 | Performed by: INTERNAL MEDICINE

## 2024-12-03 PROCEDURE — 99212 OFFICE O/P EST SF 10 MIN: CPT | Performed by: INTERNAL MEDICINE

## 2024-12-03 PROCEDURE — 3074F SYST BP LT 130 MM HG: CPT | Performed by: INTERNAL MEDICINE

## 2024-12-03 PROCEDURE — 3078F DIAST BP <80 MM HG: CPT | Performed by: INTERNAL MEDICINE

## 2024-12-03 NOTE — PROGRESS NOTES
Chief Complaint   Patient presents with    Palpitations    Other     Establish care       Subjective     Ashley Montague is a 69 y.o. 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 2019     Back pain     Chronic fatigue     Disc herniation     L4,L5    GERD (gastroesophageal reflux disease)     Orthostatic hypotension     Osteoarthritis     Perimenopausal     Senile osteoporosis 10/9/2013    Vasovagal near syncope      Past Surgical History:   Procedure Laterality Date    SHOULDER ARTHROSCOPY  2007    right    APPENDECTOMY  1970     Family History   Problem Relation Age of Onset    Osteoporosis Mother     Arrythmia Father         pacemaker in 50s    Other Sister         hyperparathyroidism    Osteoporosis Sister     Other Maternal Grandmother         hip fracture     Social History     Socioeconomic History    Marital status:      Spouse name: Not on file    Number of children: Not on file    Years of education: Not on file    Highest education level: Not on file   Occupational History    Not on file   Tobacco Use    Smoking status: Never    Smokeless tobacco: Never   Vaping Use    Vaping status: Never Used   Substance and Sexual Activity    Alcohol use: Yes     Comment: Occ    Drug use: No    Sexual activity: Not on file   Other Topics Concern    Not on file   Social History Narrative    Not on file     Social Drivers of Health     Financial Resource Strain: Not on file   Food Insecurity: Not on file   Transportation Needs: Not on file   Physical Activity: Not on file   Stress: Not on file   Social Connections: Not on file   Intimate Partner Violence: Not on file   Housing Stability: Not on file     Allergies   Allergen Reactions    Amoxicillin     Avelox [Moxifloxacin Hcl In Nacl]     Bactrim [Sulfamethoxazole W-Trimethoprim]  "     Drug Rash    Pcn [Penicillins] Hives    Sulfa Drugs Rash     Rash     Outpatient Encounter Medications as of 12/3/2024   Medication Sig Dispense Refill    Multiple Vitamins-Minerals (MULTIVITAMIN) Liquid Take  by mouth.      CALCIUM-PHOSPHORUS PO Take  by mouth.      Multiple Vitamins-Minerals (ZINC PO) Take  by mouth.      COLLAGEN PO Take  by mouth.      fluticasone (FLONASE) 50 MCG/ACT nasal spray Spray 1 Spray in nose every day.      tramadol (ULTRAM) 50 MG TABS Take 50 mg by mouth every four hours as needed.      Cholecalciferol (VITAMIN D-3) 5000 UNITS TABS Take 1 Tab by mouth every day. 30 Tab 11     No facility-administered encounter medications on file as of 12/3/2024.     ROS           Objective     /72 (BP Location: Left arm, Patient Position: Sitting, BP Cuff Size: Adult)   Pulse 67   Resp 15   Ht 1.676 m (5' 6\")   Wt 61.2 kg (135 lb)   SpO2 95%   BMI 21.79 kg/m²     Physical Exam  Constitutional:       General: She is not in acute distress.     Appearance: She is not diaphoretic.   Eyes:      General: No scleral icterus.  Neck:      Vascular: No JVD.   Cardiovascular:      Rate and Rhythm: Normal rate.      Heart sounds: Normal heart sounds. No murmur heard.     No friction rub. No gallop.   Pulmonary:      Effort: No respiratory distress.      Breath sounds: No wheezing or rales.   Abdominal:      General: Bowel sounds are normal.      Palpations: Abdomen is soft.   Musculoskeletal:      Right lower leg: No edema.      Left lower leg: No edema.   Skin:     Findings: No rash.   Neurological:      Mental Status: She is alert. Mental status is at baseline.   Psychiatric:         Mood and Affect: Mood normal.          We reviewed in person the most recent labs  Recent Results (from the past 30 weeks)   POCT RAPID STREP A    Collection Time: 06/19/24 10:23 AM   Result Value Ref Range    Rapid Strep Screen Positive (A) Negative, Invalid    Internal Control Positive Positive     Internal " Control Negative Negative    POCT COVID Antigen    Collection Time: 24 10:23 AM   Result Value Ref Range    POC SARS-COV Antigen KRISTIAN Negative     Internal Control Positive Positive     Internal Control Negative Negative    POCT INFLUENZA  A/B    Collection Time: 24 10:23 AM   Result Value Ref Range    Rapid Influenza A-B positive     Internal Control Positive Positive     Internal Control Negative Negative    EKG    Collection Time: 24  9:23 AM   Result Value Ref Range    Report       [facility]    Test Date:  2024  Pt Name:    FRANCESCO MONTAGUE                  Department: [facility]  MRN:        [id number]                      Room:  Gender:     Female                       Technician:   :        1955                   Requested By:NEDA DOWNEY  Order #:    [id number]                    Reading MD:    Measurements  Intervals                                Axis  Rate:       66                           P:          50  RI:         177                          QRS:        90  QRSD:       90                           T:          26  QT:         384  QTc:        403    Interpretive Statements  Sinus rhythm  Borderline right axis deviation  Compared to ECG 2019 14:39:08  Sinus bradycardia no longer present             Assessment & Plan     1. Palpitations  EKG      2. Encounter for medical examination to establish care  EKG      3. Orthostatic hypotension        4. Vasovagal near syncope        5. Atrial ectopy rare on holter   CBC WITHOUT DIFFERENTIAL    Comp Metabolic Panel    TSH    MAGNESIUM      6. Screening for lipid disorders  Lipid Profile      7. Cardiac arrhythmia, unspecified cardiac arrhythmia type  TSH          Medical Decision Making: Today's Assessment/Status/Plan:          It was my pleasure to meet with Ms. Montague.    Blood pressure is well controlled.  She will continue to monitor and eat hearty healthy diet.    May need florinef  eventually    I will see Ms. Montague back in 1 year time and encouraged her to follow up with us over the phone or electronically using my MyChart as issues arise.    It is my pleasure to participate in the care of Ms. Montague.  Please do not hesitate to contact me with questions or concerns.    Adan Neumann MD PhD [facility]  Cardiologist [facility]    Please note that this dictation was created using voice recognition software. There may be errors I did not discover before finalizing the note.